# Patient Record
Sex: FEMALE | Race: WHITE | NOT HISPANIC OR LATINO | Employment: OTHER | URBAN - METROPOLITAN AREA
[De-identification: names, ages, dates, MRNs, and addresses within clinical notes are randomized per-mention and may not be internally consistent; named-entity substitution may affect disease eponyms.]

---

## 2023-11-20 DIAGNOSIS — F03.C11 SEVERE DEMENTIA WITH AGITATION, UNSPECIFIED DEMENTIA TYPE (MULTI): Primary | ICD-10-CM

## 2023-11-20 RX ORDER — QUETIAPINE FUMARATE 25 MG/1
37.5 TABLET, FILM COATED ORAL NIGHTLY
COMMUNITY
Start: 2023-05-28 | End: 2023-11-24 | Stop reason: SDUPTHER

## 2023-11-24 ENCOUNTER — TELEPHONE (OUTPATIENT)
Dept: GERIATRIC MEDICINE | Facility: CLINIC | Age: 63
End: 2023-11-24

## 2023-11-24 DIAGNOSIS — F32.A DEPRESSION, UNSPECIFIED DEPRESSION TYPE: ICD-10-CM

## 2023-11-28 RX ORDER — QUETIAPINE FUMARATE 25 MG/1
37.5 TABLET, FILM COATED ORAL NIGHTLY
Qty: 135 TABLET | Refills: 1 | Status: SHIPPED | OUTPATIENT
Start: 2023-11-28

## 2023-11-28 RX ORDER — QUETIAPINE FUMARATE 25 MG/1
37.5 TABLET, FILM COATED ORAL NIGHTLY
Qty: 45 TABLET | Refills: 0 | Status: SHIPPED | OUTPATIENT
Start: 2023-11-28

## 2024-02-02 ENCOUNTER — OFFICE VISIT (OUTPATIENT)
Dept: NEUROLOGY | Facility: CLINIC | Age: 64
End: 2024-02-02
Payer: MEDICARE

## 2024-02-02 VITALS — DIASTOLIC BLOOD PRESSURE: 72 MMHG | OXYGEN SATURATION: 97 % | SYSTOLIC BLOOD PRESSURE: 130 MMHG | HEART RATE: 87 BPM

## 2024-02-02 DIAGNOSIS — F03.90 DEMENTIA WITHOUT BEHAVIORAL DISTURBANCE (MULTI): Primary | ICD-10-CM

## 2024-02-02 PROCEDURE — 99215 OFFICE O/P EST HI 40 MIN: CPT | Performed by: PSYCHIATRY & NEUROLOGY

## 2024-02-02 RX ORDER — DONEPEZIL HYDROCHLORIDE 10 MG/1
1 TABLET, FILM COATED ORAL DAILY
COMMUNITY
Start: 2017-06-29

## 2024-02-02 RX ORDER — MEMANTINE HYDROCHLORIDE 10 MG/1
1 TABLET ORAL 2 TIMES DAILY
COMMUNITY
Start: 2018-04-17 | End: 2024-11-07

## 2024-02-02 RX ORDER — ATORVASTATIN CALCIUM 20 MG/1
1 TABLET, FILM COATED ORAL DAILY
COMMUNITY
Start: 2018-12-04 | End: 2024-11-07

## 2024-02-02 RX ORDER — VENLAFAXINE HYDROCHLORIDE 150 MG/1
1 TABLET, EXTENDED RELEASE ORAL DAILY
COMMUNITY
Start: 2023-11-08 | End: 2024-11-07

## 2024-02-02 ASSESSMENT — ENCOUNTER SYMPTOMS
OCCASIONAL FEELINGS OF UNSTEADINESS: 1
DEPRESSION: 0
LOSS OF SENSATION IN FEET: 0

## 2024-02-02 ASSESSMENT — PATIENT HEALTH QUESTIONNAIRE - PHQ9
2. FEELING DOWN, DEPRESSED OR HOPELESS: NOT AT ALL
1. LITTLE INTEREST OR PLEASURE IN DOING THINGS: NOT AT ALL
SUM OF ALL RESPONSES TO PHQ9 QUESTIONS 1 AND 2: 0

## 2024-02-02 NOTE — PROGRESS NOTES
[place], [place]      Department of Neurology  [facility]     [facility] Consultation    Dr. Bertrand Pozo        2024  Re: Julia Kurtz  : 1960  MRN [id number]    CC: 62 yo woman in eval of early onset dementia.  Info from mother and the EMR.  HPI:  :  Mom describes pt as having difficulty connecting the dots.  She prev worked with structured settlements and started to decline.   She had to retire, then home with her adopted sons,  worked long hours.  : Mom and dad take care of her at their home w/ rehab supported by [facility] funds, now  M-W-T 12:30-4:30.   Pt watches DVDs, w/o behavior issues, just doesn't understand.  She eats and sleeps well; bed at 9pm, awake at 8:30a.  Wandering x1, home situation more limiting now w/ door alarms and sliding [place]ledo.  No upset, irritability.  Mom helps her dress, some help eating.  :  Stable, lately not rubbing her eye, but seeming to see at baseline.  No behavioral issues. :  Standing undisturbed and fell sticking glass pane w/o injury.  Eating & drinking fine, sleep is good overnight.  Seroquel off  Med Hx  Allergies: erythromycin  Rx:     ,   oscal/D3 500-200,    Supriya  H/O: Alzheimer's dis (EOAD), anxious depression,      S/P: reduc mammoplasty   FHx: Dad CABG; Mom well (pt's POA);    Family: mat gm, twin bro, sis w/ AD dx in 70s   Sibs:  2 younger sisters    Kids: adopted sons x2, Eloy & Rinku in [place]    PSHx: ; SFH w/ parents, college     Employ: retired        Driving: stopped Sleep: no issues  Exercise: no longer    ROS: Skin-Skel no Cardio-pulm no  U/L-GI no     Neuro:  No LBP, neck pain, HAs; w/o CVA, TIA, TMB, VBI   Exam: CR=047/68, HR=87, RR=18, xc=824  Genl:  lungs clear; RRR w/o MRG; full carotids w/o T/B;   Abdo soft scant BS     Extrems w/o edema   Neuro MS:  calm, as before, a bit avoidant, maybe not anxious not sad       no vis/aud hallucins  apparent      substantial psychomotor paucity,   self-driven hand-hand manipulation and continual teeth chatter when I'm near   CN:  FEOMs w/o nystagmus or ptosis; nl saccs, and briefly follows in symm pursuit     Face: Sens symm by orientation to LT     Motor: symm lax tone w/ little mvmt   Motor: limited coop; symm paratonia w/ no spont mvmt   MSRs +3 (brisker than before) w/o clonus throughout  ,      Sens:  orients to LT   Could not cooperate with Romberg testing      Coordin: continual slower and well-organized hand-hand self-manipulation     Gait: slow pace, symmetrical w/o armswing, arms symm mildly flexed   Cognitive:  Language: recept: responds to tone not content; express: no verbal output,  Praxis:  hand-hand or hand-object manipulation  Perception: focuses and follows presented objects then abandons in disinterest  Attention (DSSE): appears to shift attention to approach and near contact  Labs (30-Jun-2017):  glu=92, bun=13,  crt=0.84,  GFR>60,         nxcm=170, hdl=64.2, egy=829, chol/hdl=4.2,  vldl=16, TG=78   TSH=0,96       Phone w/ pt's mom (2-):  Discussed donepezil, agreed pt is beyond its reach and D/Cd.  Donepezil can decrease constip in some, but increase it in many.  I discussed w/ pt's mom using warm water enemas and starting miralax BID as we d/c donepezil.  She will call back next week.   A&P:  Summary: Pt unable to engage, mother details 8y func decline, initially dissociative then pervasive impairment.  Genl and neuro exams unremarkable, cognitive exam very limited.  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  Rx stopping memantine and Seroquel as she no longer would benefit from the memantine and no longer needs the Seroquel to remain manageable.  Plan:  Mom is a scrupulous care giver, her  fell and injured hip ? THR.  F/U: I discussed with pt's mother. I will RTC 6. Feb2024:  Pt w/ several falls at home, most  recent on glass w/o injury, otherwise doing well.       Thank you for allowing me to participate in the care of your patient.   Sincerely yours, Casper Winter M.D., Ph.D., Professor of Neurology

## 2024-02-09 ENCOUNTER — APPOINTMENT (OUTPATIENT)
Dept: NEUROLOGY | Facility: CLINIC | Age: 64
End: 2024-02-09

## 2024-06-20 PROBLEM — R29.6 FREQUENT FALLS: Status: ACTIVE | Noted: 2023-10-10

## 2024-06-20 PROBLEM — M20.41 ACQUIRED HAMMERTOES OF BOTH FEET: Status: ACTIVE | Noted: 2020-01-20

## 2024-06-20 PROBLEM — L84 HELOMA DURUM: Status: ACTIVE | Noted: 2020-01-20

## 2024-06-20 PROBLEM — L84 CORNS AND CALLOSITIES: Status: ACTIVE | Noted: 2020-01-20

## 2024-06-20 PROBLEM — I10 PRIMARY HYPERTENSION: Status: ACTIVE | Noted: 2023-10-11

## 2024-06-20 PROBLEM — R92.8 MAMMOGRAM ABNORMAL: Status: RESOLVED | Noted: 2024-06-20 | Resolved: 2024-06-20

## 2024-06-20 PROBLEM — E78.5 HYPERLIPIDEMIA: Status: ACTIVE | Noted: 2023-05-24

## 2024-06-20 PROBLEM — M20.42 ACQUIRED HAMMERTOES OF BOTH FEET: Status: ACTIVE | Noted: 2020-01-20

## 2024-06-20 PROBLEM — R41.89 IMPAIRED COGNITION: Status: ACTIVE | Noted: 2023-10-10

## 2024-06-20 PROBLEM — G30.9 ALZHEIMER'S DISEASE (MULTI): Status: ACTIVE | Noted: 2023-05-24

## 2024-06-20 PROBLEM — Z86.59 HISTORY OF DEPRESSION: Status: ACTIVE | Noted: 2023-10-10

## 2024-06-20 PROBLEM — F02.80 ALZHEIMER'S DISEASE (MULTI): Status: ACTIVE | Noted: 2023-05-24

## 2024-06-20 PROBLEM — F41.8 DEPRESSION WITH ANXIETY: Status: ACTIVE | Noted: 2024-06-20

## 2024-08-02 ENCOUNTER — APPOINTMENT (OUTPATIENT)
Dept: NEUROLOGY | Facility: CLINIC | Age: 64
End: 2024-08-02
Payer: MEDICARE

## 2024-08-06 ENCOUNTER — APPOINTMENT (OUTPATIENT)
Dept: NEUROLOGY | Facility: CLINIC | Age: 64
End: 2024-08-06
Payer: MEDICARE

## 2024-08-09 ENCOUNTER — APPOINTMENT (OUTPATIENT)
Dept: NEUROLOGY | Facility: CLINIC | Age: 64
End: 2024-08-09
Payer: MEDICARE

## 2024-10-21 ENCOUNTER — APPOINTMENT (OUTPATIENT)
Dept: NEUROLOGY | Facility: CLINIC | Age: 64
End: 2024-10-21
Payer: MEDICARE

## 2025-03-17 ENCOUNTER — APPOINTMENT (OUTPATIENT)
Dept: NEUROLOGY | Facility: CLINIC | Age: 65
End: 2025-03-17
Payer: MEDICARE

## 2025-04-01 ENCOUNTER — APPOINTMENT (OUTPATIENT)
Dept: NEUROLOGY | Facility: CLINIC | Age: 65
End: 2025-04-01
Payer: MEDICARE